# Patient Record
Sex: FEMALE | Race: WHITE | Employment: FULL TIME | URBAN - METROPOLITAN AREA
[De-identification: names, ages, dates, MRNs, and addresses within clinical notes are randomized per-mention and may not be internally consistent; named-entity substitution may affect disease eponyms.]

---

## 2016-11-18 LAB
BLD GP AB SCN SERPL QL: NORMAL
HBV SURFACE AG SERPL QL IA: NORMAL
HIV 1+2 AB+HIV1 P24 AG SERPL QL IA: NORMAL
RUBELLA ANTIBODY IGG QUANTITATIVE: NORMAL IU/ML
T PALLIDUM IGG SER QL: NORMAL

## 2017-05-30 LAB — GROUP B STREP PCR: NORMAL

## 2017-06-24 ENCOUNTER — ANESTHESIA (OUTPATIENT)
Dept: OBGYN | Facility: CLINIC | Age: 37
End: 2017-06-24
Payer: COMMERCIAL

## 2017-06-24 ENCOUNTER — ANESTHESIA EVENT (OUTPATIENT)
Dept: OBGYN | Facility: CLINIC | Age: 37
End: 2017-06-24
Payer: COMMERCIAL

## 2017-06-24 ENCOUNTER — HOSPITAL ENCOUNTER (INPATIENT)
Facility: CLINIC | Age: 37
LOS: 2 days | Discharge: HOME | End: 2017-06-26
Attending: OBSTETRICS & GYNECOLOGY | Admitting: OBSTETRICS & GYNECOLOGY
Payer: COMMERCIAL

## 2017-06-24 LAB
ABO + RH BLD: NORMAL
ABO + RH BLD: NORMAL
SPECIMEN EXP DATE BLD: NORMAL

## 2017-06-24 PROCEDURE — 86900 BLOOD TYPING SEROLOGIC ABO: CPT | Performed by: OBSTETRICS & GYNECOLOGY

## 2017-06-24 PROCEDURE — 86901 BLOOD TYPING SEROLOGIC RH(D): CPT | Performed by: OBSTETRICS & GYNECOLOGY

## 2017-06-24 PROCEDURE — 72200001 ZZH LABOR CARE VAGINAL DELIVERY SINGLE

## 2017-06-24 PROCEDURE — 25000128 H RX IP 250 OP 636: Performed by: OBSTETRICS & GYNECOLOGY

## 2017-06-24 PROCEDURE — 25000125 ZZHC RX 250: Performed by: OBSTETRICS & GYNECOLOGY

## 2017-06-24 PROCEDURE — 25000128 H RX IP 250 OP 636: Performed by: SURGERY

## 2017-06-24 PROCEDURE — 36415 COLL VENOUS BLD VENIPUNCTURE: CPT | Performed by: OBSTETRICS & GYNECOLOGY

## 2017-06-24 PROCEDURE — 86780 TREPONEMA PALLIDUM: CPT | Performed by: OBSTETRICS & GYNECOLOGY

## 2017-06-24 PROCEDURE — 3E033VJ INTRODUCTION OF OTHER HORMONE INTO PERIPHERAL VEIN, PERCUTANEOUS APPROACH: ICD-10-PCS | Performed by: OBSTETRICS & GYNECOLOGY

## 2017-06-24 PROCEDURE — 10907ZC DRAINAGE OF AMNIOTIC FLUID, THERAPEUTIC FROM PRODUCTS OF CONCEPTION, VIA NATURAL OR ARTIFICIAL OPENING: ICD-10-PCS | Performed by: OBSTETRICS & GYNECOLOGY

## 2017-06-24 PROCEDURE — 0KQM0ZZ REPAIR PERINEUM MUSCLE, OPEN APPROACH: ICD-10-PCS | Performed by: OBSTETRICS & GYNECOLOGY

## 2017-06-24 PROCEDURE — 12000037 ZZH R&B POSTPARTUM INTERMEDIATE

## 2017-06-24 PROCEDURE — 25000125 ZZHC RX 250: Performed by: SURGERY

## 2017-06-24 PROCEDURE — 37000011 ZZH ANESTHESIA WARD SERVICE

## 2017-06-24 PROCEDURE — 25000132 ZZH RX MED GY IP 250 OP 250 PS 637: Performed by: OBSTETRICS & GYNECOLOGY

## 2017-06-24 PROCEDURE — 3E0R3CZ INTRODUCTION OF REGIONAL ANESTHETIC INTO SPINAL CANAL, PERCUTANEOUS APPROACH: ICD-10-PCS | Performed by: SURGERY

## 2017-06-24 PROCEDURE — 00HU33Z INSERTION OF INFUSION DEVICE INTO SPINAL CANAL, PERCUTANEOUS APPROACH: ICD-10-PCS | Performed by: SURGERY

## 2017-06-24 RX ORDER — OXYCODONE AND ACETAMINOPHEN 5; 325 MG/1; MG/1
1 TABLET ORAL
Status: DISCONTINUED | OUTPATIENT
Start: 2017-06-24 | End: 2017-06-25 | Stop reason: CLARIF

## 2017-06-24 RX ORDER — OXYTOCIN 10 [USP'U]/ML
10 INJECTION, SOLUTION INTRAMUSCULAR; INTRAVENOUS
Status: DISCONTINUED | OUTPATIENT
Start: 2017-06-24 | End: 2017-06-26 | Stop reason: HOSPADM

## 2017-06-24 RX ORDER — NALOXONE HYDROCHLORIDE 0.4 MG/ML
.1-.4 INJECTION, SOLUTION INTRAMUSCULAR; INTRAVENOUS; SUBCUTANEOUS
Status: DISCONTINUED | OUTPATIENT
Start: 2017-06-24 | End: 2017-06-25 | Stop reason: CLARIF

## 2017-06-24 RX ORDER — BISACODYL 10 MG
10 SUPPOSITORY, RECTAL RECTAL DAILY PRN
Status: DISCONTINUED | OUTPATIENT
Start: 2017-06-26 | End: 2017-06-26 | Stop reason: HOSPADM

## 2017-06-24 RX ORDER — OXYTOCIN/0.9 % SODIUM CHLORIDE 30/500 ML
100-340 PLASTIC BAG, INJECTION (ML) INTRAVENOUS CONTINUOUS PRN
Status: COMPLETED | OUTPATIENT
Start: 2017-06-24 | End: 2017-06-24

## 2017-06-24 RX ORDER — IBUPROFEN 400 MG/1
400-800 TABLET, FILM COATED ORAL EVERY 6 HOURS PRN
Status: DISCONTINUED | OUTPATIENT
Start: 2017-06-24 | End: 2017-06-26 | Stop reason: HOSPADM

## 2017-06-24 RX ORDER — CARBOPROST TROMETHAMINE 250 UG/ML
250 INJECTION, SOLUTION INTRAMUSCULAR
Status: DISCONTINUED | OUTPATIENT
Start: 2017-06-24 | End: 2017-06-25 | Stop reason: CLARIF

## 2017-06-24 RX ORDER — ONDANSETRON 2 MG/ML
4 INJECTION INTRAMUSCULAR; INTRAVENOUS EVERY 6 HOURS PRN
Status: DISCONTINUED | OUTPATIENT
Start: 2017-06-24 | End: 2017-06-25 | Stop reason: CLARIF

## 2017-06-24 RX ORDER — LIDOCAINE 40 MG/G
CREAM TOPICAL
Status: DISCONTINUED | OUTPATIENT
Start: 2017-06-24 | End: 2017-06-25 | Stop reason: CLARIF

## 2017-06-24 RX ORDER — MISOPROSTOL 200 UG/1
400 TABLET ORAL
Status: DISCONTINUED | OUTPATIENT
Start: 2017-06-24 | End: 2017-06-26 | Stop reason: HOSPADM

## 2017-06-24 RX ORDER — NALBUPHINE HYDROCHLORIDE 10 MG/ML
2.5-5 INJECTION, SOLUTION INTRAMUSCULAR; INTRAVENOUS; SUBCUTANEOUS EVERY 6 HOURS PRN
Status: DISCONTINUED | OUTPATIENT
Start: 2017-06-24 | End: 2017-06-25 | Stop reason: CLARIF

## 2017-06-24 RX ORDER — AMOXICILLIN 250 MG
1-2 CAPSULE ORAL 2 TIMES DAILY
Status: DISCONTINUED | OUTPATIENT
Start: 2017-06-24 | End: 2017-06-26 | Stop reason: HOSPADM

## 2017-06-24 RX ORDER — HYDROCORTISONE 2.5 %
CREAM (GRAM) TOPICAL 3 TIMES DAILY PRN
Status: DISCONTINUED | OUTPATIENT
Start: 2017-06-24 | End: 2017-06-26 | Stop reason: HOSPADM

## 2017-06-24 RX ORDER — OXYTOCIN/0.9 % SODIUM CHLORIDE 30/500 ML
1-24 PLASTIC BAG, INJECTION (ML) INTRAVENOUS CONTINUOUS
Status: DISCONTINUED | OUTPATIENT
Start: 2017-06-24 | End: 2017-06-25 | Stop reason: CLARIF

## 2017-06-24 RX ORDER — IBUPROFEN 800 MG/1
800 TABLET, FILM COATED ORAL
Status: DISCONTINUED | OUTPATIENT
Start: 2017-06-24 | End: 2017-06-25 | Stop reason: CLARIF

## 2017-06-24 RX ORDER — BUPIVACAINE HYDROCHLORIDE 2.5 MG/ML
INJECTION, SOLUTION EPIDURAL; INFILTRATION; INTRACAUDAL
Status: DISCONTINUED
Start: 2017-06-24 | End: 2017-06-24 | Stop reason: HOSPADM

## 2017-06-24 RX ORDER — OXYTOCIN/0.9 % SODIUM CHLORIDE 30/500 ML
340 PLASTIC BAG, INJECTION (ML) INTRAVENOUS CONTINUOUS PRN
Status: DISCONTINUED | OUTPATIENT
Start: 2017-06-24 | End: 2017-06-26 | Stop reason: HOSPADM

## 2017-06-24 RX ORDER — ACETAMINOPHEN 325 MG/1
650 TABLET ORAL EVERY 4 HOURS PRN
Status: DISCONTINUED | OUTPATIENT
Start: 2017-06-24 | End: 2017-06-25 | Stop reason: CLARIF

## 2017-06-24 RX ORDER — ACETAMINOPHEN 325 MG/1
650 TABLET ORAL EVERY 4 HOURS PRN
Status: DISCONTINUED | OUTPATIENT
Start: 2017-06-24 | End: 2017-06-26 | Stop reason: HOSPADM

## 2017-06-24 RX ORDER — LANOLIN 100 %
OINTMENT (GRAM) TOPICAL
Status: DISCONTINUED | OUTPATIENT
Start: 2017-06-24 | End: 2017-06-26 | Stop reason: HOSPADM

## 2017-06-24 RX ORDER — METHYLERGONOVINE MALEATE 0.2 MG/ML
200 INJECTION INTRAVENOUS
Status: DISCONTINUED | OUTPATIENT
Start: 2017-06-24 | End: 2017-06-25 | Stop reason: CLARIF

## 2017-06-24 RX ORDER — OXYTOCIN 10 [USP'U]/ML
10 INJECTION, SOLUTION INTRAMUSCULAR; INTRAVENOUS
Status: DISCONTINUED | OUTPATIENT
Start: 2017-06-24 | End: 2017-06-25 | Stop reason: CLARIF

## 2017-06-24 RX ORDER — ROPIVACAINE HYDROCHLORIDE 2 MG/ML
10 INJECTION, SOLUTION EPIDURAL; INFILTRATION; PERINEURAL ONCE
Status: COMPLETED | OUTPATIENT
Start: 2017-06-24 | End: 2017-06-24

## 2017-06-24 RX ORDER — NALOXONE HYDROCHLORIDE 0.4 MG/ML
.1-.4 INJECTION, SOLUTION INTRAMUSCULAR; INTRAVENOUS; SUBCUTANEOUS
Status: DISCONTINUED | OUTPATIENT
Start: 2017-06-24 | End: 2017-06-26 | Stop reason: HOSPADM

## 2017-06-24 RX ORDER — OXYTOCIN/0.9 % SODIUM CHLORIDE 30/500 ML
100 PLASTIC BAG, INJECTION (ML) INTRAVENOUS CONTINUOUS
Status: DISCONTINUED | OUTPATIENT
Start: 2017-06-24 | End: 2017-06-26 | Stop reason: HOSPADM

## 2017-06-24 RX ORDER — EPHEDRINE SULFATE 50 MG/ML
5 INJECTION, SOLUTION INTRAMUSCULAR; INTRAVENOUS; SUBCUTANEOUS
Status: DISCONTINUED | OUTPATIENT
Start: 2017-06-24 | End: 2017-06-25 | Stop reason: CLARIF

## 2017-06-24 RX ORDER — FENTANYL CITRATE 50 UG/ML
100 INJECTION, SOLUTION INTRAMUSCULAR; INTRAVENOUS ONCE
Status: COMPLETED | OUTPATIENT
Start: 2017-06-24 | End: 2017-06-24

## 2017-06-24 RX ORDER — SODIUM CHLORIDE, SODIUM LACTATE, POTASSIUM CHLORIDE, CALCIUM CHLORIDE 600; 310; 30; 20 MG/100ML; MG/100ML; MG/100ML; MG/100ML
INJECTION, SOLUTION INTRAVENOUS CONTINUOUS
Status: DISCONTINUED | OUTPATIENT
Start: 2017-06-24 | End: 2017-06-25 | Stop reason: CLARIF

## 2017-06-24 RX ADMIN — ROPIVACAINE HYDROCHLORIDE 3 ML: 2 INJECTION, SOLUTION EPIDURAL; INFILTRATION at 14:41

## 2017-06-24 RX ADMIN — Medication 5 MG: at 14:50

## 2017-06-24 RX ADMIN — ROPIVACAINE HYDROCHLORIDE 3 ML: 2 INJECTION, SOLUTION EPIDURAL; INFILTRATION at 14:42

## 2017-06-24 RX ADMIN — SODIUM CHLORIDE, POTASSIUM CHLORIDE, SODIUM LACTATE AND CALCIUM CHLORIDE: 600; 310; 30; 20 INJECTION, SOLUTION INTRAVENOUS at 16:28

## 2017-06-24 RX ADMIN — ROPIVACAINE HYDROCHLORIDE 3 ML: 2 INJECTION, SOLUTION EPIDURAL; INFILTRATION at 12:38

## 2017-06-24 RX ADMIN — ROPIVACAINE HYDROCHLORIDE 4 ML: 2 INJECTION, SOLUTION EPIDURAL; INFILTRATION at 12:40

## 2017-06-24 RX ADMIN — SODIUM CHLORIDE, POTASSIUM CHLORIDE, SODIUM LACTATE AND CALCIUM CHLORIDE 1000 ML: 600; 310; 30; 20 INJECTION, SOLUTION INTRAVENOUS at 11:57

## 2017-06-24 RX ADMIN — Medication 5 MG: at 15:00

## 2017-06-24 RX ADMIN — SODIUM CHLORIDE, POTASSIUM CHLORIDE, SODIUM LACTATE AND CALCIUM CHLORIDE 500 ML: 600; 310; 30; 20 INJECTION, SOLUTION INTRAVENOUS at 14:51

## 2017-06-24 RX ADMIN — SODIUM CHLORIDE, POTASSIUM CHLORIDE, SODIUM LACTATE AND CALCIUM CHLORIDE: 600; 310; 30; 20 INJECTION, SOLUTION INTRAVENOUS at 08:43

## 2017-06-24 RX ADMIN — Medication 2 MILLI-UNITS/MIN: at 08:53

## 2017-06-24 RX ADMIN — FENTANYL CITRATE 100 MCG: 50 INJECTION, SOLUTION INTRAMUSCULAR; INTRAVENOUS at 12:36

## 2017-06-24 RX ADMIN — IBUPROFEN 400 MG: 400 TABLET ORAL at 22:26

## 2017-06-24 RX ADMIN — ROPIVACAINE HYDROCHLORIDE 4 ML: 2 INJECTION, SOLUTION EPIDURAL; INFILTRATION at 14:44

## 2017-06-24 RX ADMIN — Medication 340 ML/HR: at 19:28

## 2017-06-24 RX ADMIN — ROPIVACAINE HYDROCHLORIDE 3 ML: 2 INJECTION, SOLUTION EPIDURAL; INFILTRATION at 12:36

## 2017-06-24 RX ADMIN — SENNOSIDES AND DOCUSATE SODIUM 1 TABLET: 8.6; 5 TABLET ORAL at 22:26

## 2017-06-24 RX ADMIN — Medication 12 ML/HR: at 12:47

## 2017-06-24 RX ADMIN — SODIUM CHLORIDE, POTASSIUM CHLORIDE, SODIUM LACTATE AND CALCIUM CHLORIDE: 600; 310; 30; 20 INJECTION, SOLUTION INTRAVENOUS at 12:55

## 2017-06-24 RX ADMIN — ACETAMINOPHEN 650 MG: 325 TABLET, FILM COATED ORAL at 22:26

## 2017-06-24 NOTE — H&P
36 year old  woman  G 2 P 1  Presents to [facility] L&D today for elective induction of labor    Prenatal course   EDC    Gestational age 39 weeks 3 days   A positive   Ab screen negative   Rubella immune   RPR / HIV / HepB / HepC all negative   2016 hgb 12.2 / PLT 333K   Normal NIPT / XY   Normal NT / 1.2 mm   Normal msAFP / 1.55 MOM   Normal fetal anatomy US    Posterior placenta   No GDM   GBS negative   US EFW May 30 = 2877 grams / AC+2d    Past history   Seasonal allergies   [place] teeth extraction        41 week induction    Oxytocin / AROM / epidural    2nd stage 160 minutes    3317 gram female    Shoulder dystocia <2 minutes; baby uninjured    2nd degree laceration    Nursed 8 months    NKDA     exam:  102/60  Weight 126 lb  Cervix 3+ / 1 cm long / vtx -2  ([place] 8)    A/p    39 week 3 day primipara  History of vaginal delivery, ; that delivery notable for brief shoulder dystocia (baby uninjured)  Presents for elective induction of labor    Uncomplicated prenatal course  GBS negative  Normal US EFW 3+ weeks ago  No GDM  Cervix favorable    Plan  Oxytocin  AROM  Epidural    Pt & spouse aware of recurrence risk of shoulder dystocia; given her parity & favorable US EFW, prognosis for safe outcome likely excellent    Manny RAMIREZ

## 2017-06-24 NOTE — ANESTHESIA PROCEDURE NOTES
Peripheral nerve/Neuraxial procedure note : epidural catheter  Pre-Procedure  Performed by ANUPAM BALDERAS  Location: OB      Pre-Anesthestic Checklist: patient identified, IV checked, risks and benefits discussed, informed consent, monitors and equipment checked, pre-op evaluation and at physician/surgeon's request    Timeout  Correct Patient: Yes   Correct Procedure: Yes   Correct Site: Yes   Correct Laterality: N/A   Correct Position: Yes   Site Marked: N/A   .   Procedure Documentation    .    Procedure:    Epidural catheter.  Insertion Site:L3-4  (midline approach) Injection technique: LORT saline   Local skin infiltrated with 3 mL of 1% lidocaine.  ANNA at 6 cm     Patient Prep;mask, sterile gloves, povidone-iodine 7.5% surgical scrub, patient draped.  .  Needle: ToMagnomaticsy needle Needle Gauge: 17.    Needle Length (Inches) 3.5  # of attempts: 1 and # of redirects: : 0. .   Catheter: 19 G . .  Catheter threaded easily  4 cm epidural space.  10 cm at skin.   .    Assessment/Narrative  Paresthesias: No.  .  .  Aspiration negative for heme or CSF  . Test dose of 3 mL lidocaine 1.5% w/ 1:200,000 epinephrine at 12:38.  Test dose negative for signs of intravascular, subdural or intrathecal injection. Comments:  Pt tolerated well. No complications.   Catheter taped sterile and securely with sterile medical adhesive spray and tegaderm.   Pt placed back in supine with CRUZ.   FHTs stable post-procedure.

## 2017-06-24 NOTE — PROGRESS NOTES
Oxytocin underway; currently @ 6 mU/min    Contractions mildly uncomfortable, every 2-3 minutes    Afebrile  Normotensive    FHT Category1    AROM clear  4-5 / 1 cm long / vtx -2    A/p    Still in latent phase  FHT reassuring  AROM performed to augment progress    Will cpm with oxytocin    Pt may have epidural anytime    Manny RAMIREZ

## 2017-06-24 NOTE — IP AVS SNAPSHOT
MRN:[id number]                      After Visit Summary   6/24/2017    Juanis Peterson    MRN: [id number]           Thank you!     Thank you for choosing [facility] for your care. Our goal is always to provide you with excellent care. Hearing back from our patients is one way we can continue to improve our services. Please take a few minutes to complete the written survey that you may receive in the mail after you visit with us. Thank you!        Patient Information     Date Of Birth          1980        About your hospital stay     You were admitted on:  June 24, 2017 You last received care in the:  [address]    You were discharged on:  June 26, 2017       Who to Call     For medical emergencies, please call 911.  For non-urgent questions about your medical care, please call your primary care provider or clinic, [phone]          Attending Provider     Provider Specialty    Mick Bryant MD OB/Gyn       Primary Care Provider Office Phone # Fax #    Mick Bryant [phone]      After Care Instructions     Activity       Review discharge instructions            Diet       Resume previous diet            Discharge Instructions - Gestational diabetic patients       Gestational diabetic patients to follow up for fasting blood sugar and 2 hour 75gm glucose load at 6 weeks postpartum.            Discharge Instructions - Postpartum visit       Schedule postpartum visit with your provider and return to clinic in 6 weeks.                  Further instructions from your care team       Postpartum Vaginal Delivery Instructions    Activity       Ask family and friends for help when you need it.    Do not place anything in your vagina for 6 weeks.    You are not restricted on other activities, but take it easy for a few weeks to allow your body to recover from delivery.  You are able to do any activities you feel up to that point.    No driving until you  have stopped taking your pain medications (usually two weeks after delivery).     Call your health care provider if you have any of these symptoms:       Increased pain, swelling, redness, or fluid around your stiches from an episiotomy or perineal tear.    A fever above 100.4 F (38 C) with or without chills when placing a thermometer under your tongue.    You soak a sanitary pad with blood within 1 hour, or you see blood clots larger than a golf ball.    Bleeding that lasts more than 6 weeks.    Vaginal discharge that smells bad.    Severe pain, cramping or tenderness in your lower belly area.    A need to urinate more frequently (use the toilet more often), more urgently (use the toilet very quickly), or it burns when you urinate.    Nausea and vomiting.    Redness, swelling or pain around a vein in your leg.    Problems breastfeeding or a red or painful area on your breast.    Chest pain and cough or are gasping for air.    Problems coping with sadness, anxiety, or depression.  If you have any concerns about hurting yourself or the baby, call your provider immediately.     You have questions or concerns after you return home.     Keep your hands clean:  Always wash your hands before touching your perineal area and stitches.  This helps reduce your risk of infection.  If your hands aren't dirty, you may use an alcohol hand-rub to clean your hands. Keep your nails clean and short.        Pending Results     No orders found from 6/22/2017 to 6/25/2017.            Statement of Approval     Ordered          06/26/17 1228  I have reviewed and agree with all the recommendations and orders detailed in this document.  EFFECTIVE NOW     Approved and electronically signed by:  Mick Bryant MD             Admission Information     Date & Time Provider Department Dept. Phone    6/24/2017 Mick Bryant MD [address] [phone]      Your Vitals Were     Blood Pressure Pulse Temperature  "Respirations Pulse Oximetry       113/69 (BP Location: Right arm) 70 98.2  F (36.8  C) (Oral) 16 100%       MyChart Information     [employer] lets you send messages to your doctor, view your test results, renew your prescriptions, schedule appointments and more. To sign up, go to [email or web address] . Click on \"Log in\" on the left side of the screen, which will take you to the Welcome page. Then click on \"Sign up Now\" on the right side of the page.     You will be asked to enter the access code listed below, as well as some personal information. Please follow the directions to create your username and password.     Your access code is: [email or web address]  Expires: 2017 12:34 PM     Your access code will  in 90 days. If you need help or a new code, please call your [facility] clinic or [phone].        Care EveryWhere ID     This is your Care EveryWhere ID. This could be used by other organizations to access your [facility] medical records  [id number]        Equal Access to Services     [facility] : Hadangie Joy, wajarrett rosario, alicia jackson, abimael frias. So [facility] [phone].    ATENCIÓN: Si habla español, tiene a montoya disposición servicios gratuitos de asistencia lingüística. Nikki al [phone].    We comply with applicable federal civil rights laws and [place] laws. We do not discriminate on the basis of race, color, national origin, age, disability sex, sexual orientation or gender identity.               Review of your medicines      CONTINUE these medicines which have NOT CHANGED        Dose / Directions    DHA PO        Refills:  0       prenatal multivitamin  with iron 28-0.8 MG Tabs   Used for:  Onychomycosis of toenail        Dose:  1 tablet   Take 1 tablet by mouth daily   Quantity:  30 each   Refills:  0         STOP taking     econazole nitrate 1 % cream                    Protect others around you: Learn how to safely use, store " and throw away your medicines at www.disposemymeds.org.             Medication List: This is a list of all your medications and when to take them. Check marks below indicate your daily home schedule. Keep this list as a reference.      Medications           Morning Afternoon Evening Bedtime As Needed    DHA PO                                prenatal multivitamin  with iron 28-0.8 MG Tabs   Take 1 tablet by mouth daily

## 2017-06-24 NOTE — PROGRESS NOTES
"Pushing (total ~25 minutes) since ~1725    C / C / +2 of 5 / ROP    Attempts to manually rotate fetal vertex unsuccessful    Efforts decent, though pt still somewhat numb (epidural effect)    FHT Category 2  Preparations were made ~15 minutes ago to proceed with operative delivery (forceps) due to a series of consecutive FHR decelerations (moderate / severe variables)  FHT now stable (again) with mild variable decelerations (moderate variability)    Oxytocin was @ 3 mU/min, then off, now @ 1 mU/min    >>>will discontinue pushing for now  Will attempt to \"labor down\", and to allow pt to regain some sensation  Fetal status reassuring @ present    Pt & spouse aware of possible need for operative intervention (eg as above)    Manny RAMIREZ  "

## 2017-06-24 NOTE — PROGRESS NOTES
Feeling pressure    RIM / C / 0+ / ROP    FHT Category 2, reassuring    >>>will restart IV oxytocin    Manny RAMIREZ

## 2017-06-24 NOTE — ANESTHESIA PREPROCEDURE EVALUATION
"Procedure: * No procedures listed *  Preop diagnosis: * No pre-op diagnosis entered *    No Known Allergies  Past Medical History:   Diagnosis Date     Anxiety      Shoulder dystocia      Past Surgical History:   Procedure Laterality Date     HC TOOTH EXTRACTION W/FORCEP       Prior to Admission medications    Medication Sig Start Date End Date Taking? Authorizing Provider   Docosahexaenoic Acid (DHA PO)    Yes Reported, Patient   Prenatal Vit-Fe Fumarate-FA (PRENATAL MULTIVITAMIN  WITH IRON) 28-0.8 MG TABS Take 1 tablet by mouth daily 6/3/14  Yes Kamala Strong DPM, Podiatry/Foot and Ankle Surgery   econazole nitrate 1 % cream Apply topically 2 times daily Apply to affected nail/s twice a day 6/3/14   Kamala Strong DPM, Podiatry/Foot and Ankle Surgery     Current Facility-Administered Medications Ordered in Epic   Medication Dose Route Frequency Last Rate Last Dose     lactated ringers infusion   Intravenous Continuous 125 mL/hr at 06/24/17 1255       lactated ringers BOLUS 500 mL  500 mL Intravenous Once PRN         acetaminophen (TYLENOL) tablet 650 mg  650 mg Oral Q4H PRN         naloxone (NARCAN) injection 0.1-0.4 mg  0.1-0.4 mg Intravenous Q2 Min PRN         ondansetron (ZOFRAN) injection 4 mg  4 mg Intravenous Q6H PRN         oxytocin (PITOCIN) injection 10 Units  10 Units Intramuscular Once PRN         oxytocin (PITOCIN) 30 units in 500 mL 0.9% NaCl infusion  100-340 mL/hr Intravenous Continuous PRN         Medication Instructions: misoprostol (CYTOTEC)- Nurse to discuss ordering with provider, if needed. Ordered via \"OB misoprostol (CYTOTEC) Postpartum Hemorrhage PANEL\"   Does not apply Continuous PRN         methylergonovine (METHERGINE) injection 200 mcg  200 mcg Intramuscular Once PRN         carboprost (HEMABATE) injection 250 mcg  250 mcg Intramuscular Once PRN         lidocaine 1 % 0.1-20 mL  0.1-20 mL Subcutaneous Once PRN         ibuprofen (ADVIL/MOTRIN) tablet 800 mg  800 mg Oral Once PRN     "     oxyCODONE-acetaminophen (PERCOCET) 5-325 MG per tablet 1 tablet  1 tablet Oral Once PRN         lidocaine 1 % 1 mL  1 mL Other Q1H PRN         lidocaine (LMX4) cream   Topical Q1H PRN         sodium chloride (PF) 0.9% PF flush 3 mL  3 mL Intracatheter Q1H PRN         sodium chloride (PF) 0.9% PF flush 3 mL  3 mL Intracatheter Q8H         oxytocin (PITOCIN) 30 units in 500 mL 0.9% NaCl infusion  1-24 micheline-units/min Intravenous Continuous   Stopped at 06/24/17 1300     medication instruction   Does not apply Continuous PRN         Opioid plan postpartum - medication instruction   Does not apply Continuous PRN         ropivacaine (NAROPIN) injection 10 mL  10 mL EPIDURAL Once         fentaNYL Citrate (PF) (SUBLIMAZE) injection 100 mcg  100 mcg EPIDURAL Once         fentaNYL (SUBLIMAZE) 2 mcg/mL, ropivacaine (NAROPIN) 0.2% in NaCl 0.9% EPIDURAL infusion   EPIDURAL Continuous 12 mL/hr at 06/24/17 1247 12 mL/hr at 06/24/17 1247     lactated ringers BOLUS 250 mL  250 mL Intravenous Once PRN         ePHEDrine injection 5 mg  5 mg Intravenous Q3 Min PRN         nalbuphine (NUBAIN) injection 2.5-5 mg  2.5-5 mg Intravenous Q6H PRN         naloxone (NARCAN) injection 0.1-0.4 mg  0.1-0.4 mg Intravenous Q2 Min PRN         medication instruction   Does not apply Continuous PRN         medication instruction   Does not apply Continuous PRN         Opioid plan postpartum - medication instruction   Does not apply Continuous PRN         bupivacaine (MARCAINE) 0.125 % injection (diluted from stock concentration by MD or CRNA) 12.5 mg  10 mL EPIDURAL Once         lactated ringers BOLUS 250 mL  250 mL Intravenous Once PRN         ePHEDrine injection 5 mg  5 mg Intravenous Q3 Min PRN         bupivacaine HCl 0.25 % injection SOLN             No current Epic-ordered outpatient prescriptions on file.     Wt Readings from Last 1 Encounters:   06/03/14 49.4 kg (108 lb 12.8 oz)     Temp Readings from Last 1 Encounters:   05/12/16 36.9  C  (98.4  F) (Oral)     BP Readings from Last 6 Encounters:   06/24/17 106/56   05/12/16 103/65   06/03/14 92/54     Pulse Readings from Last 4 Encounters:   06/03/14 65     Resp Readings from Last 1 Encounters:   05/12/16 12     SpO2 Readings from Last 1 Encounters:   06/24/17 97%     No results for input(s): NA, POTASSIUM, CHLORIDE, CO2, ANIONGAP, GLC, BUN, CR, PORFIRIO in the last 36294 hours.  No results for input(s): WBC, HGB, PLT in the last 69285 hours.  No results for input(s): INR in the last 50809 hours.    Invalid input(s): APTT   RECENT LABS:   ECG:   ECHO:   CXR:      Anesthesia Evaluation       history and physical reviewed .             ROS/MED HX    ENT/Pulmonary:  - neg pulmonary ROS     Neurologic:  - neg neurologic ROS     Cardiovascular:  - neg cardiovascular ROS       METS/Exercise Tolerance:     Hematologic:         Musculoskeletal:         GI/Hepatic:         Renal/Genitourinary:         Endo:         Psychiatric:         Infectious Disease:         Malignancy:         Other:                     Physical Exam  Normal systems: cardiovascular and pulmonary    Airway   Mallampati: II  TM distance: > 3 FB  Neck ROM: full  Mouth opening: > 3 cm    Dental     Cardiovascular       Pulmonary     Other findings: H/o mild to moderate lumbar scoliosis                 Anesthesia Plan      History & Physical Review      ASA Status:  .  OB Epidural Asa: 2            Postoperative Care      Consents  Anesthetic plan, risks, benefits and alternatives discussed with:  Patient..                          .

## 2017-06-24 NOTE — ANESTHESIA PROCEDURE NOTES
Peripheral nerve/Neuraxial procedure note : epidural catheter  Pre-Procedure  Performed by ANUPAM BALDERAS  Location: OB      Pre-Anesthestic Checklist: patient identified, IV checked, risks and benefits discussed, informed consent, monitors and equipment checked, pre-op evaluation and at physician/surgeon's request    Timeout  Correct Patient: Yes   Correct Procedure: Yes   Correct Site: Yes   Correct Laterality: N/A   Correct Position: Yes   Site Marked: N/A   .   Procedure Documentation    .    Procedure:    Epidural catheter.  Insertion Site:L3-4  (midline approach) Injection technique: LORT saline   Local skin infiltrated with 3 mL of 1% lidocaine.  ANNA at 6 cm     Patient Prep;mask, sterile gloves, povidone-iodine 7.5% surgical scrub, patient draped.  .  Needle: ToTOMODOy needle Needle Gauge: 17.    Needle Length (Inches) 3.5  # of attempts: 1 and # of redirects: : 0. .   Catheter: 19 G . .  Catheter threaded easily  4 cm epidural space.  10 cm at skin.   .    Assessment/Narrative  Paresthesias: No.  .  .  Aspiration negative for heme or CSF  . Test dose of 3 mL lidocaine 1.5% w/ 1:200,000 epinephrine at 14:41.  Test dose negative for signs of intravascular, subdural or intrathecal injection. Comments:  Pt tolerated well. No complications.   Catheter taped sterile and securely with sterile medical adhesive spray and tegaderm.   Pt placed back in supine with CRUZ.   FHTs stable post-procedure.      Epidural Replacement:  Moderate scoliosis - needle started midline and directed right greco as a paramedian approach to compensate for scoliosis.

## 2017-06-24 NOTE — IP AVS SNAPSHOT
[address]    [place] [address]    Phone:  [phone]                                       After Visit Summary   6/24/2017    Juanis Peterson    MRN: [id number]           After Visit Summary Signature Page     I have received my discharge instructions, and my questions have been answered. I have discussed any challenges I see with this plan with the nurse or doctor.    ..........................................................................................................................................  Patient/Patient Representative Signature      ..........................................................................................................................................  Patient Representative Print Name and Relationship to Patient    ..................................................               ................................................  Date                                            Time    ..........................................................................................................................................  Reviewed by Signature/Title    ...................................................              ..............................................  Date                                                            Time

## 2017-06-24 NOTE — PROGRESS NOTES
S/p epidural replacement  Feeling more comfort    Normotensive    FHT Category 2  Moderate variability  Some mild variable decelerations    Oxytocin off >1 hr  Previously @ 8 mU/min    8 / C / 0+ / ROP    A/p    Active phase  Good progress  FHT reassuring    Epidural now working well    Anticipate 2nd stage soon    Manny RAMIREZ

## 2017-06-25 LAB — T PALLIDUM IGG+IGM SER QL: NEGATIVE

## 2017-06-25 PROCEDURE — 12000037 ZZH R&B POSTPARTUM INTERMEDIATE

## 2017-06-25 PROCEDURE — 25000132 ZZH RX MED GY IP 250 OP 250 PS 637: Performed by: OBSTETRICS & GYNECOLOGY

## 2017-06-25 RX ORDER — OXYCODONE HYDROCHLORIDE 5 MG/1
5-10 TABLET ORAL
Status: DISCONTINUED | OUTPATIENT
Start: 2017-06-25 | End: 2017-06-26 | Stop reason: HOSPADM

## 2017-06-25 RX ADMIN — IBUPROFEN 800 MG: 400 TABLET ORAL at 16:37

## 2017-06-25 RX ADMIN — OXYCODONE HYDROCHLORIDE 5 MG: 5 TABLET ORAL at 14:23

## 2017-06-25 RX ADMIN — ACETAMINOPHEN 650 MG: 325 TABLET, FILM COATED ORAL at 10:37

## 2017-06-25 RX ADMIN — IBUPROFEN 800 MG: 400 TABLET ORAL at 04:35

## 2017-06-25 RX ADMIN — ACETAMINOPHEN 650 MG: 325 TABLET, FILM COATED ORAL at 04:35

## 2017-06-25 RX ADMIN — IBUPROFEN 800 MG: 400 TABLET ORAL at 22:40

## 2017-06-25 RX ADMIN — ACETAMINOPHEN 650 MG: 325 TABLET, FILM COATED ORAL at 16:37

## 2017-06-25 RX ADMIN — OXYCODONE HYDROCHLORIDE 5 MG: 5 TABLET ORAL at 10:37

## 2017-06-25 RX ADMIN — OXYCODONE HYDROCHLORIDE 5 MG: 5 TABLET ORAL at 20:30

## 2017-06-25 RX ADMIN — IBUPROFEN 800 MG: 400 TABLET ORAL at 10:37

## 2017-06-25 RX ADMIN — ACETAMINOPHEN 650 MG: 325 TABLET, FILM COATED ORAL at 22:40

## 2017-06-25 RX ADMIN — OXYCODONE HYDROCHLORIDE 5 MG: 5 TABLET ORAL at 03:30

## 2017-06-25 RX ADMIN — SENNOSIDES AND DOCUSATE SODIUM 2 TABLET: 8.6; 5 TABLET ORAL at 20:30

## 2017-06-25 RX ADMIN — OXYCODONE HYDROCHLORIDE 5 MG: 5 TABLET ORAL at 17:26

## 2017-06-25 RX ADMIN — OXYCODONE HYDROCHLORIDE 5 MG: 5 TABLET ORAL at 07:25

## 2017-06-25 RX ADMIN — SENNOSIDES AND DOCUSATE SODIUM 1 TABLET: 8.6; 5 TABLET ORAL at 07:26

## 2017-06-25 RX ADMIN — OXYCODONE HYDROCHLORIDE 5 MG: 5 TABLET ORAL at 23:29

## 2017-06-25 RX ADMIN — OXYCODONE HYDROCHLORIDE 5 MG: 5 TABLET ORAL at 00:41

## 2017-06-25 NOTE — PLAN OF CARE
Problem: Goal Outcome Summary  Goal: Goal Outcome Summary  Outcome: Improving  VSS.  Pain well controlled, requesting prn pain medications as needed.  Up independently in room.  Working on breastfeeding  and  cares. Pt has very swollen and slightly bruised labia d/t infant being shoulder dystocia and forceps delivery.  Pt needed to be straight cathed overnight, but has been able to void spontaneously since.  Bleeding appropriate.  FF 1/U.  Significant other present and supportive at bedside.  Continue to monitor and notify MD as needed.

## 2017-06-25 NOTE — LACTATION NOTE
Initial visit.  first for six months, had mastitis.  Breastfeeding handout given.   Advised to breastfeed exclusively, on demand, avoid pacifiers, bottles and formula unless medically indicated.  Encouraged rooming in, skin to skin, feeding on demand 8-12x/day or sooner if baby cues.  Explained benefits of holding and skin to skin.  Encouraged lots of skin to skin.  Instructed on hand expression.    Continues to nurse well per mom. No further questions at this time.   Will follow as needed.   Rosemary Swift RNC, IBCLC

## 2017-06-25 NOTE — PROCEDURES
Low forceps vaginal delivery  FHR decelerations in 2nd stage    Espinoza forceps applied to ROP  Delivery over 1+ contraction  Normal traction  +2 of 5    Epidural    Male @ 1923  3440 grams  apgars 8-9    Shoulder dystocia ~60-90 seconds  Resolved with Darryl / Jossy    NNP @ delivery    Placenta spont / intact / 3vc    EBL ~200cc    2nd degree perineal laceration, repaired with chromic    Rh positive   Rubella immune    Dictated    Manny RAMIREZ

## 2017-06-25 NOTE — PLAN OF CARE
Problem: Goal Outcome Summary  Goal: Goal Outcome Summary  Patient meeting expected goals for this shift.  Using ibuprofen & tylenol for pain/comfort. Feeling lightheaded and had small void but remains 2/U.  Pushing oral fluids and will get up to empty bladder by the end of this shift.  Working on breastfeeding  and  cares.   present at bedside and supportive.  Positive bonding behaviors observed.  Continue to monitor and notify MD as needed.

## 2017-06-25 NOTE — PROGRESS NOTES
Pt. admitted from L&D  via wheelchair and transferred to bed with assist of 1. Pt. arrived with baby and was accompanied by her  and arrived with personal belongings. Report was taken from TIGRE Maldonado in L&D. VSS. Fundus is firm and midline.  Vaginal bleeding is scant.  Oxytocin is infusing.  Pt. oriented to the room and call light system.

## 2017-06-25 NOTE — PLAN OF CARE
Problem: Goal Outcome Summary  Goal: Goal Outcome Summary  Outcome: Improving  VSS.  Pain well controlled, requesting prn pain medications as needed.  Patient is taking ibuprofen and tylenol as well 5 mg oxycodone as needed.  Perinium is very swollen and nursing is encouraging her to use ice packs and tucks.  Up independently in room.  Working on breastfeeding  and  cares. On pathway. Continue to monitor and notify MD as needed.

## 2017-06-25 NOTE — PROGRESS NOTES
PPD 1    Voiding freely  Doing better  Pain well controlled with oxycodone    Afebrile  Normotensive    Fundus nt  Ext nt    Rh positive  Rubella immune    A/p    Stable  S/p LFVD yesterday evening    Baby well; no apparent complications from shoulder dystocia    Routine care    Anticipate discharge home tomorrow    Manny RAMIREZ

## 2017-06-25 NOTE — LACTATION NOTE
Baby able to latch on and hold nipple in mouth, licking drops off right nipple. Instructed on hand expression.  Gave mother a spoon and she was able to hand express and EBM given via spoon.  Baby then attempted to latch onto the left breast and was able to take a few suckles.  Baby snorting while at breast improved when portioned in sitting on and facing mother position.  No further questions at this time. Will follow as needed. Rosemary Swift RNC, IBCLC

## 2017-06-25 NOTE — PLAN OF CARE
Problem: Goal Outcome Summary  Goal: Goal Outcome Summary  Outcome: Improving  Patient delivered baby boy at 1923 by forceps.  Patient on postpartum recovery pathway.  Epidural catheter out at 2030 tip intact.  FFu/u small lochia.  Perineum swollen, ice on,  laceration well approximated.  Patient bonding with infant and family present currently.  No complaints of pain at present time.  Anticipate transfer to 4th floor.,

## 2017-06-25 NOTE — PROGRESS NOTES
Data: Juanis Peterson transferred to room 409 via wheelchair at 2200. Baby transferred via crib.  Action: Receiving unit notified of transfer: Yes. Patient and family notified of room change. Report given to Jasmyne DALTON RN at 2200. Belongings sent to receiving unit. Accompanied by Registered Nurse. Oriented patient to surroundings. Call light within reach. ID bands double-checked with receiving RN.  Response: Patient tolerated transfer and is stable.

## 2017-06-26 VITALS
SYSTOLIC BLOOD PRESSURE: 113 MMHG | OXYGEN SATURATION: 100 % | TEMPERATURE: 98.2 F | DIASTOLIC BLOOD PRESSURE: 69 MMHG | HEART RATE: 70 BPM | RESPIRATION RATE: 16 BRPM

## 2017-06-26 PROCEDURE — 25000132 ZZH RX MED GY IP 250 OP 250 PS 637: Performed by: OBSTETRICS & GYNECOLOGY

## 2017-06-26 RX ADMIN — IBUPROFEN 800 MG: 400 TABLET ORAL at 05:14

## 2017-06-26 RX ADMIN — ACETAMINOPHEN 650 MG: 325 TABLET, FILM COATED ORAL at 05:14

## 2017-06-26 NOTE — PLAN OF CARE
Problem: Goal Outcome Summary  Goal: Goal Outcome Summary  Outcome: Improving  VSS.  Pain well controlled, requesting prn pain medications as needed.  Up independently in room.  Working on breastfeeding  and  cares.  Needing assistance with latching infant.  Perineum still soft with palpitation.  Voiding and passing flatus without difficulty.   present and supportive at bedside. On pathway. Continue to monitor and notify MD as needed.

## 2017-06-26 NOTE — DISCHARGE SUMMARY
DATE OF ADMISSION:  2017.      DATE OF DISCHARGE:  2017.        Brief history of delivery as follows; Juanis Peterson 36-year-old para 1, history of a vaginal delivery in  who presented on  at 39 weeks and 3 days gestation for elective labor induction.      She had an unremarkable prenatal course.      On the evening of  she had a low forceps vaginal delivery for fetal heart rate tracing decelerations in the second stage of labor; a 3440 gram male infant was delivered.  We did encounter a shoulder dystocia for 60 to 90 seconds.  Of note, this occurred as well with her  delivery, at which time the baby thankfully did well.      Her postdelivery course was notable for a significant amount of pain on the evening of delivery; she had good effect from oxycodone.  By postpartum day #1 and #2, she was doing much better, she was voiding freely, pains were improving.  She had normal vital signs and she was discharged home on midday on 2017.  She declined need for narcotic prescription.  She was instructed to follow up in the office in 6 weeks' time.      Dr. Mick Purcell, low forceps vaginal delivery for this admission for Juanis Peterson admitted , discharged 2017.         MICK PURCELL MD             D: 2017 12:37   T: 2017 16:00   MT: [id number]      Name:     JUANIS PETERSON   MRN:      [id number]        Account:        [id number]   :      1980           Admit Date:                                       Discharge Date: 2017      Document: [id number]

## 2017-06-26 NOTE — L&D DELIVERY NOTE
PROCEDURE:  Low forceps vaginal delivery.      HISTORY:  Juanis Peterson is a 36-year-old  woman who is a para 1.  She has a past obstetrical history notable for a spontaneous vaginal delivery of a 3,300 gm female infant in the year 2014.  That delivery was notable for a second stage of 2 hours and 40 minutes and shoulder dystocia of less than 2 minutes at which time thankfully the baby was uninjured.      Juanis presented with the current pregnancy to [facility] on 06/24/2017 at 39 weeks and 3 days' gestation for elective labor induction.      Her prenatal course was unremarkable.  Blood type is A positive, and Rubella status is immune.  She had a normal noninvasive prenatal screen, normal nuchal translucency, normal maternal serum alpha-fetoprotein and a normal fetal anatomy ultrasound.  She did not have gestational diabetes, and her Group B Strep status is negative.  Approximately 3 weeks prior to presentation an ultrasound estimated fetal weight at over 2,800 gm; this was in the normal range.  Her past history is otherwise unremarkable.  She has no known allergies.      FIRST STAGE:  Upon presentation Juanis's vital signs were reassuring, as was the fetal heart rate tracing.  Her admission Clifton score was 8.  Cervix was 3+ cm dilated, and we began intravenous oxytocin.  I performed artificial rupture of membranes after 1100 hrs when she was 4-5 cm dilated and not yet in active labor.  We continued with oxytocin, and she received an epidural at approximately 1300 hrs.  The initial epidural did not have a good effect, and the epidural was therefore replaced at approximately 1445 hrs.  The second epidural had much better effect.  Juanis was 8 cm dilated by 1500 hrs and was in active labor.  The fetal vertex was in the right occiput posterior position.  Oxytocin had been as high as 8 milliunits per minute, but at this time it was off.  There were variable decelerations noted  on the fetal heart rate tracing, but the tracing remained reassuring at a Category 2.  We re-started oxytocin in the upcoming hours at a low level.  Juanis reached complete dilatation at approximately 6748-8305 hrs.      SECOND STAGE:  Juanis pushed for approximately 30 minutes.  The fetal vertex was in    the +2/5 station in the right occiput posterior position.  Her efforts were decent, but attempts to manually rotate the fetal vertex were unsuccessful.  There were repetitive variable decelerations, and some were moderate and severe.  We did make preparations to proceed with expediting vaginal delivery with a trial of forceps because of the variable decelerations, but thankfully the fetal heart rate tracing stabilized.  We then made a decision for Juanis to stop pushing and allow her to regain some sensation over the next 1-2 hours.  Pitocin was re-started at this time at only 1 milliunit per minute.  The fetal heart rate tracing remained reassuring over the course of the next hour.        Juanis resumed her second stage after 1900 hrs.  The fetal vertex was a +2/5 station in the right occiput posterior position.  She had regained some sensation and made good pushing efforts.  However, immediately after Juanis resumed pushing the fetal heart rate tracing decelerations recurred, and oxytocin was turned off.  We then made the decision to proceed with a forceps delivery.  Juanis and her  consented verbally to the use of forceps.  I explained why she was a safe candidate for this maneuver, given:  the identifiable position and station of the fetal vertex this would be classified as a low forceps attempt, the clinical estimated fetal weight of approximately 7-1/2 pounds which was favorable, and Juanis's pelvimetry was felt to be adequate.  I explained the benefits of the forceps maneuver and explained that it would allow her to achieve a vaginal delivery with a shorter recovery and less blood loss versus an average   section.  I explained the risks of forceps including, but not limited to fetal facial injury, vaginal injury and failure requiring  section.  Since the start of the process Juanis and her  had also been aware of the possible recurrent risk of shoulder dystocia.      Espinoza forceps were applied to the right occiput posterior position in the +2/5 station.  Initially the blade on the maternal left was placed, followed by the blade on the maternal right.  Correct placement was noted as the sagittal suture bisected the plane of the forceps.  Delivery of the infant was achieved from the right occiput posterior position over 1+ contraction with normal traction.  As the fetal vertex delivered the forceps were removed, and the mouth and nares were bulb-suctioned.  The  Nurse Practitioner was in attendance.  We then encountered a shoulder dystocia of approximately    60-90 seconds.  Ultimately, with the combination of the Darryl maneuver and the woodscrew technique rotating the fetus in the clockwise position, the remainder of the infant was delivered.  The cord was doubly clamped and cut, and the infant was handed to the  Nurse Practitioner.  The infant was a 3,440 gm male delivered at 1923 hrs on 2017.  Apgars were 8/9 at 1/5 minutes.        THIRD STAGE:  The placenta delivered spontaneously intact and was found to have a 3-vessel cord.  The uterus was firming adequately with manual massage and intravenous Pitocin.  Juanis sustained a second-degree perineal laceration which was repaired in standard fashion with chromic gut suture.  Total blood loss for the delivery and repair is estimated to be 200 cc.      This was a low forceps vaginal delivery for fetal heart rate tracing decelerations in the second stage of labor, repair of second-degree perineal laceration with delivery notable for a 60-90 second shoulder dystocia which was resolved.  Please note that as with her delivery  in , we reviewed the nature of the shoulder dystocia, and Juanis and her  were to confer with their Pediatrician the day after delivery to hopefully ensure that neither a clavicle fracture nor Erb's palsy had occurred.  These injuries were thankfully not apparent at this time.  The questions of Juanis and her  were answered to their satisfaction.         TERENCE PURCELL MD             D: 2017 11:13   T: 2017 13:54   MT: #[id number]      Name:     JUANIS FRANCISCO   MRN:      [id number]        Account:        [id number]   :      1980           Delivery Date:  2017      Document: [id number]

## 2017-06-26 NOTE — PLAN OF CARE
Problem: Goal Outcome Summary  Goal: Goal Outcome Summary  Outcome: Adequate for Discharge Date Met:  17  Pt's pain controlled with Ibuprofen/Tylenol. Up and about in room. Going home today with .

## 2017-06-26 NOTE — LACTATION NOTE
Routine visit. Pt states breastfeeding is going well and that she has been able to express lots of colostrum. Pt discharging home today. Encouraged pt to continue marking feeds, voids and stools on feeding log once at home and to use IBCLC at [facility] as needed.

## 2017-06-26 NOTE — PROGRESS NOTES
"PPD 2    Doing \"much better\" today    Afebrile  Normotensive    Ready for discharge    Declines need for narcotic rx    Instructions reviewed    To f/u in office in 6 weeks    Manny RAMIREZ  "

## 2017-06-26 NOTE — PLAN OF CARE
Problem: Goal Outcome Summary  Goal: Goal Outcome Summary  Outcome: Improving  VSS. Fundus firm with appropriate vaginal flow, perineum very swollen still but no hematoma noted. Pain managed well with tylenol, ibuprofen & 5mg oxycodone. Working on breastfeeding infant every 2-3 hours, infant turned 24 hours this shift and started having more interest in feeding at the breast. Mother able to hand express colostrum in spoon and feed to baby. Up ambulating in room, voiding without difficulty. Independent with self cares and baby cares. Receiving good support from family at bedside. Will continue to monitor.

## 2017-06-26 NOTE — ANESTHESIA POSTPROCEDURE EVALUATION
Patient: Juanis Peterson    * No procedures listed *    Diagnosis:* No pre-op diagnosis entered *  Diagnosis Additional Information: No value filed.    Anesthesia Type:  No value filed.    Note:  Anesthesia Post Evaluation    Patient location during evaluation: Bedside  Patient participation: Able to fully participate in evaluation  Level of consciousness: awake and alert     Anesthetic complications: None    Comments: Pt doing well.  Denies epidural-related complaints.        Last vitals:  Vitals:    06/25/17 0041 06/25/17 0700 06/25/17 1727   BP: 105/65 96/53 (!) 82/46   Pulse:      Resp: 18 16 16   Temp: 36.4  C (97.6  F) 36.7  C (98.1  F) 36.7  C (98.1  F)   SpO2:            Electronically Signed By: KAYLIN GONGORA  June 25, 2017  8:19 PM

## 2021-04-13 ENCOUNTER — IMMUNIZATION (OUTPATIENT)
Dept: NURSING | Facility: CLINIC | Age: 41
End: 2021-04-13
Payer: COMMERCIAL

## 2021-04-13 PROCEDURE — 91300 PR COVID VAC PFIZER DIL RECON 30 MCG/0.3 ML IM: CPT

## 2021-04-13 PROCEDURE — 0001A PR COVID VAC PFIZER DIL RECON 30 MCG/0.3 ML IM: CPT

## 2021-04-24 ENCOUNTER — HEALTH MAINTENANCE LETTER (OUTPATIENT)
Age: 41
End: 2021-04-24

## 2021-05-04 ENCOUNTER — IMMUNIZATION (OUTPATIENT)
Dept: NURSING | Facility: CLINIC | Age: 41
End: 2021-05-04
Attending: INTERNAL MEDICINE
Payer: COMMERCIAL

## 2021-05-04 PROCEDURE — 0002A PR COVID VAC PFIZER DIL RECON 30 MCG/0.3 ML IM: CPT

## 2021-05-04 PROCEDURE — 91300 PR COVID VAC PFIZER DIL RECON 30 MCG/0.3 ML IM: CPT

## 2021-10-03 ENCOUNTER — HEALTH MAINTENANCE LETTER (OUTPATIENT)
Age: 41
End: 2021-10-03

## 2022-05-15 ENCOUNTER — HEALTH MAINTENANCE LETTER (OUTPATIENT)
Age: 42
End: 2022-05-15

## 2022-09-10 ENCOUNTER — HEALTH MAINTENANCE LETTER (OUTPATIENT)
Age: 42
End: 2022-09-10

## 2023-06-03 ENCOUNTER — HEALTH MAINTENANCE LETTER (OUTPATIENT)
Age: 43
End: 2023-06-03

## 2024-02-18 ENCOUNTER — HEALTH MAINTENANCE LETTER (OUTPATIENT)
Age: 44
End: 2024-02-18

## 2025-06-28 ENCOUNTER — HEALTH MAINTENANCE LETTER (OUTPATIENT)
Age: 45
End: 2025-06-28